# Patient Record
(demographics unavailable — no encounter records)

---

## 2025-05-16 NOTE — HISTORY OF PRESENT ILLNESS
[Suicidal Behavior/Ideation] : suicidal behavior/ideation [Not Applicable] : Not applicable [FreeTextEntry1] : Pt is a 16 year old female in 10th grade at [employer], with IEP, domiciled with family, w/pph of ADHD and anxiety, history of medication trials, currently in treatment with psychiatry and therapy, recently switched to virtual due to being discharged from [facility], no inpt admissions, no history of aggression and violence, no substance use, history of SI, no plan or intent, no history of NSSIB, BIB mother for safety check due to SI.  The patient reported experiencing suicidal ideation yesterday at school in the context of feeling anxious and overwhelmed.  While she denied any intent to harm herself, she noted a recent increase in her medication dosage, which she believes has exacerbated her anxiety.  She described her mood as tired and depressed, a state she reports has persisted for a significant duration, with anxiety worsening since last year. Although she thinks about death and the afterlife, she denied active suicidal ideation.  The patient endorsed difficulty falling asleep, fatigue, lack of motivation, and difficulty waking up for school, resulting in declining grades this quarter.  She reported poor focus and concentration despite taking medication, which she perceives as ineffective.  The patient denied any history of non-suicidal self-injury and reported adequate social supports and no bullying.  However, she acknowledged anxiety in social settings, particularly when meeting new people or engaging in conversations with new friends.  The patient identified the death of her paternal grandfather several years ago as a significant impactful event, but denied any other trauma or history of abuse.  She reported compliance with medication and treatment and disclosed being discharged from a [facility] due to perceived lack of progress.  She has recently engaged in [facility] psychiatry and therapy services, attending one session thus far with ongoing Wednesday appointments.  The patient denied any acute safety concerns, successfully engaged in safety planning and contracted for safety, and agreed with the recommendation to continue with her current providers.  Collateral information was obtained from the patient's mother with the assistance of  services.  The mother reported that the school contacted her yesterday due to the patient expressing suicidal ideation, requiring a safety assessment and clearance for her to return to school. The mother stated that the patient's anxiety has been increasing due to school-related stressors.  She reported that approximately one year ago, the patient expressed significant fear of dying and frequently inquired about what happens after death.  The mother stated that she regularly checks in with her daughter to assess for suicidal ideation and denies any acute safety concerns.  She denied any significant changes in the patient's mood, affect, motivation, or overall functioning, acknowledging the patient's anxiety but denying a history of panic attacks.  She also denied any sleep or appetite issues. The mother reported that while the patient experiences difficulties with focus and concentration, she is compliant with medication and treatment for ADHD. Adequate social supports were reported, and current bullying was denied, although a history of bullying during middle school was acknowledged. The mother reported a history of speech delay and late walking, which led to early intervention services, an Individualized Education Program (IEP), and ongoing school supports.  A family history of anxiety treated with medication was noted in the patient's aunts and a cousin.  The mother denied any history of trauma or abuse for the patient and any significant medical history or known allergies.  She confirmed the patient's treatment compliance and upcoming weekly therapy appointment scheduled for Wednesday, May 21, 2025.  The mother reported no firearms in the home and demonstrated receptiveness to information regarding means restriction. She denied any acute safety concerns, concurred with the plan for the patient to return to her current provider, and received a letter clearing the patient for return to school. No further treatment is indicated at this time.  [FreeTextEntry2] : No inpt admissions, currently in outpatient treatment for medication management and therapy [FreeTextEntry3] : Fluoxetine 20mg,  Atomoxetine qhs 25 mg

## 2025-05-16 NOTE — REASON FOR VISIT
[Behavioral Health Urgent Care Assessment] : a behavioral health urgent care assessment [School] : school [Patient] : patient [Self] : alone [Mother] : with mother [Language Line ] : provided by Language Line   [Time Spent: ____ minutes] : Total time spent using  services: [unfilled] minutes. The patient's primary language is not English thus required  services. [TextBox_17] : Safety check [Interpreters_IDNumber] : [id number]

## 2025-05-16 NOTE — RISK ASSESSMENT
[Clinical Interview] : Clinical Interview [Collateral Sources] : Collateral Sources [In last 30 days] : in the last 30 days [No] : No [ADHD] : ADHD [Depressed mood/Anhedonia] : depressed mood/anhedonia [Other: ___] : [unfilled] [Identifies reasons for living] : identifies reasons for living [Supportive social network of family or friends] : supportive social network of family or friends [Positive therapeutic relationships] : positive therapeutic relationships [Engaged in work or school] : engaged in work or school [None in the patient's lifetime] : None in the patient's lifetime [None Known] : none known [No known risk factors] : No known risk factors [Residential stability] : residential stability [Relationship stability] : relationship stability [Affective stability] : affective stability [Engagement in treatment] : engagement in treatment [Yes] : yes [FreeTextEntry1] : Pt denies SI, plan or intent and urges for NSSIB [de-identified] : Pt has no access to guns

## 2025-05-16 NOTE — RISK ASSESSMENT
[Clinical Interview] : Clinical Interview [Collateral Sources] : Collateral Sources [In last 30 days] : in the last 30 days [No] : No [ADHD] : ADHD [Depressed mood/Anhedonia] : depressed mood/anhedonia [Other: ___] : [unfilled] [Identifies reasons for living] : identifies reasons for living [Supportive social network of family or friends] : supportive social network of family or friends [Positive therapeutic relationships] : positive therapeutic relationships [Engaged in work or school] : engaged in work or school [None in the patient's lifetime] : None in the patient's lifetime [None Known] : none known [No known risk factors] : No known risk factors [Residential stability] : residential stability [Relationship stability] : relationship stability [Affective stability] : affective stability [Engagement in treatment] : engagement in treatment [Yes] : yes [FreeTextEntry1] : Pt denies SI, plan or intent and urges for NSSIB [de-identified] : Pt has no access to guns

## 2025-05-16 NOTE — REASON FOR VISIT
[Behavioral Health Urgent Care Assessment] : a behavioral health urgent care assessment [School] : school [Patient] : patient [Self] : alone [Mother] : with mother [Language Line ] : provided by Language [facility]   [Time Spent: ____ minutes] : Total time spent using  services: [unfilled] minutes. The patient's primary language is not English thus required  services. [TextBox_17] : Safety check [Interpreters_IDNumber] : [id number]

## 2025-05-16 NOTE — DISCUSSION/SUMMARY
[Low acute suicide risk] : Low acute suicide risk [No] : No [Yes] : Safety Plan completed/updated (for individuals at risk): Yes [FreeTextEntry1] : Pt presents as low risk with risk factors including anxiety and passive SI with significant protective factors such as strong family support, lack of prior self-harm, no prior suicide attempts, no substance use, willingness to engage in treatment, engaged in school, current denial of any SI, plan or intent or urges to self-harm, no reports hx of abuse, no aggression/violence, no access to guns and no legal hx.

## 2025-05-16 NOTE — PLAN
[Provision of National Suicide Prevention Lifeline 1-645-230-TALK (9794)] : Provision of national suicide prevention lifeline 8-115-893-talk (9645) [Patient] : patient [Family] : family [Education provided regarding environmental safety/ lethal means restriction] : Education provided regarding environmental safety/ lethal means restriction [Contact was Attempted] : no contact was attempted [Reached regarding Plan] : not reached regarding plan [TextBox_9] : Refer back to providers - letter provided clearing to return to school [TextBox_13] : Safety plan completed with patient using the [employer] Safety Plan."  The Safety Plan is a best practice recommendation by the Suicide Prevention Resource Center.  Safety planning reviewed with patient & family. Advised to secure all potentially dangerous items from home, including but not limited to sharp objects, weapons, prescription and non-prescription medications, and other lethal means out of patient's reach. They deny having any firearms at home. Parent agreed. Parent and patient advised to visit the nearest ED or call 911 for any worsening symptoms or if safety concerns arise. 1800-LIFENET provided. All involved verbalized understanding. [TextBox_26] : no school consent - letter was provided

## 2025-05-16 NOTE — PLAN
[Provision of National Suicide Prevention Lifeline 5-298-622-TALK (2426)] : Provision of national suicide prevention lifeline 2-167-307-talk (3820) [Patient] : patient [Family] : family [Education provided regarding environmental safety/ lethal means restriction] : Education provided regarding environmental safety/ lethal means restriction [Contact was Attempted] : no contact was attempted [Reached regarding Plan] : not reached regarding plan [TextBox_9] : Refer back to providers - letter provided clearing to return to school [TextBox_13] : Safety plan completed with patient using the [employer] Safety Plan."  The Safety Plan is a best practice recommendation by the Suicide Prevention Resource Center.  Safety planning reviewed with patient & family. Advised to secure all potentially dangerous items from home, including but not limited to sharp objects, weapons, prescription and non-prescription medications, and other lethal means out of patient's reach. They deny having any firearms at home. Parent agreed. Parent and patient advised to visit the nearest ED or call 911 for any worsening symptoms or if safety concerns arise. 1800-LIFENET provided. All involved verbalized understanding. [TextBox_26] : no school consent - letter was provided

## 2025-05-16 NOTE — HISTORY OF PRESENT ILLNESS
[Suicidal Behavior/Ideation] : suicidal behavior/ideation [Not Applicable] : Not applicable [FreeTextEntry1] : Pt is a 16 year old female in 10th grade at [employer], with IEP, domiciled with family, w/pph of ADHD and anxiety, history of medication trials, currently in treatment with psychiatry and therapy, recently switched to virtual due to being discharged from [facility], no inpt admissions, no history of aggression and violence, no substance use, history of SI, no plan or intent, no history of NSSIB, BIB mother for safety check due to SI.  The patient reported experiencing suicidal ideation yesterday at school in the context of feeling anxious and overwhelmed.  While she denied any intent to harm herself, she noted a recent increase in her medication dosage, which she believes has exacerbated her anxiety.  She described her mood as tired and depressed, a state she reports has persisted for a significant duration, with anxiety worsening since last year. Although she thinks about death and the afterlife, she denied active suicidal ideation.  The patient endorsed difficulty falling asleep, fatigue, lack of motivation, and difficulty waking up for school, resulting in declining grades this quarter.  She reported poor focus and concentration despite taking medication, which she perceives as ineffective.  The patient denied any history of non-suicidal self-injury and reported adequate social supports and no bullying.  However, she acknowledged anxiety in social settings, particularly when meeting new people or engaging in conversations with new friends.  The patient identified the death of her paternal grandfather several years ago as a significant impactful event, but denied any other trauma or history of abuse.  She reported compliance with medication and treatment and disclosed being discharged from a [facility] due to perceived lack of progress.  She has recently engaged in [facility] psychiatry and therapy services, attending one session thus far with ongoing Wednesday appointments.  The patient denied any acute safety concerns, successfully engaged in safety planning and contracted for safety, and agreed with the recommendation to continue with her current providers.  Collateral information was obtained from the patient's mother with the assistance of  services.  The mother reported that the school contacted her yesterday due to the patient expressing suicidal ideation, requiring a safety assessment and clearance for her to return to school. The mother stated that the patient's anxiety has been increasing due to school-related stressors.  She reported that approximately one year ago, the patient expressed significant fear of dying and frequently inquired about what happens after death.  The mother stated that she regularly checks in with her daughter to assess for suicidal ideation and denies any acute safety concerns.  She denied any significant changes in the patient's mood, affect, motivation, or overall functioning, acknowledging the patient's anxiety but denying a history of panic attacks.  She also denied any sleep or appetite issues. The mother reported that while the patient experiences difficulties with focus and concentration, she is compliant with medication and treatment for ADHD. Adequate social supports were reported, and current bullying was denied, although a history of bullying during middle school was acknowledged. The mother reported a history of speech delay and late walking, which led to early intervention services, an Individualized Education Program (IEP), and ongoing school supports.  A family history of anxiety treated with medication was noted in the patient's aunts and a cousin.  The mother denied any history of trauma or abuse for the patient and any significant medical history or known allergies.  She confirmed the patient's treatment compliance and upcoming weekly therapy appointment scheduled for Wednesday, May 21, 2025.  The mother reported no firearms in the home and demonstrated receptiveness to information regarding means restriction. She denied any acute safety concerns, concurred with the plan for the patient to return to her current provider, and received a letter clearing the patient for return to school. No further treatment is indicated at this time.  [FreeTextEntry2] : No inpt admissions, currently in outpatient treatment for medication management and therapy [FreeTextEntry3] : Fluoxetine 20mg,  Atomoxetine qhs 25 mg

## 2025-05-16 NOTE — PHYSICAL EXAM
[Well groomed] : well groomed [Intermittent] : intermittent [Cooperative] : cooperative [Anxious] : anxious [Full] : full [Clear] : clear [Linear/Goal Directed] : linear/goal directed [Average] : average [WNL] : within normal limits [Positive interaction] : positive interaction [Unremarkable/age appropriate] : unremarkable/age appropriate [Normal] : normal [None] : none [FreeTextEntry5] : Guarded